# Patient Record
Sex: MALE | Race: BLACK OR AFRICAN AMERICAN | URBAN - METROPOLITAN AREA
[De-identification: names, ages, dates, MRNs, and addresses within clinical notes are randomized per-mention and may not be internally consistent; named-entity substitution may affect disease eponyms.]

---
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
## 2023-05-25 RX ORDER — CETIRIZINE HYDROCHLORIDE 10 MG/1
10 TABLET ORAL DAILY
COMMUNITY
Start: 2016-05-18

## 2023-05-25 RX ORDER — FLUTICASONE PROPIONATE 50 MCG
SPRAY, SUSPENSION (ML) NASAL
COMMUNITY
Start: 2016-05-18

## 2024-12-27 ENCOUNTER — OFFICE VISIT (OUTPATIENT)
Age: 24
End: 2024-12-27

## 2024-12-27 VITALS
HEART RATE: 80 BPM | DIASTOLIC BLOOD PRESSURE: 78 MMHG | BODY MASS INDEX: 25.19 KG/M2 | OXYGEN SATURATION: 99 % | TEMPERATURE: 99.1 F | SYSTOLIC BLOOD PRESSURE: 128 MMHG | HEIGHT: 72 IN | WEIGHT: 186 LBS

## 2024-12-27 DIAGNOSIS — J06.9 VIRAL UPPER RESPIRATORY TRACT INFECTION: Primary | ICD-10-CM

## 2024-12-27 RX ORDER — DEXTROMETHORPHAN HYDROBROMIDE AND PROMETHAZINE HYDROCHLORIDE 15; 6.25 MG/5ML; MG/5ML
5 SYRUP ORAL 4 TIMES DAILY PRN
Qty: 100 ML | Refills: 0 | Status: SHIPPED | OUTPATIENT
Start: 2024-12-27 | End: 2025-01-01

## 2024-12-27 RX ORDER — FEXOFENADINE HCL 180 MG/1
180 TABLET ORAL DAILY
Qty: 30 TABLET | Refills: 0 | Status: SHIPPED | OUTPATIENT
Start: 2024-12-27 | End: 2025-01-26

## 2024-12-27 RX ORDER — BENZONATATE 200 MG/1
200 CAPSULE ORAL 2 TIMES DAILY PRN
Qty: 14 CAPSULE | Refills: 0 | Status: SHIPPED | OUTPATIENT
Start: 2024-12-27 | End: 2025-01-03

## 2024-12-27 NOTE — PROGRESS NOTES
Subjective   History was provided by the patient.    Mannie Calzada is a 24 y.o. male who presents for evaluation of symptoms of a URI. Symptoms include nasal congestion, non-productive cough, and cervical adenopathy. Onset of symptoms was 1 week ago, and is stable since that time. Evaluation to date: none. Treatment to date: Mucinex, Dayquil, Nyquil, Theraflu.       Objective   Physical Exam   General: alert, appears stated age, and cooperative  Ear exam: normal TM's and external ear canals both ears  Sinus exam: Normal paranasal sinuses without tenderness  Oropharynx exam: normal findings: pink and moist  Neck exam: mild anterior cervical adenopathy  Heart exam: S1 and S2 normal  Lung exam: clear to auscultation bilaterally      Assessment   1. Viral upper respiratory tract infection  Patient  appears well, VSS, afebrile, SPO2 99% on room air. No distress noted.   Ears normal.  Throat and pharynx normal.  Neck supple. Mild adenopathy in the neck. Nose is congested. Sinuses non tender. The chest is clear, without wheezes or rales.    Patient is previously healthy and immunocompetent, presenting with symptoms suspicious for likely viral upper respiratory infection.  Differential includes acute bronchitis, rhinosinusitis, allergic rhinitis, bacterial pneumonia, or COVID. Patient is nontoxic appearing and not in need of emergent medical intervention.    The patient is a good candidate for outpatient therapy based on normal PO intake, reassuring exam with clear lungs on auscultation, normal oxygen saturations and lack of respiratory distress upon discharge.    Discharge decision based on the following:  clinical impression is consistent with outpatient treatment, patient's exam is stable and patient's condition is stable. Patient advised if symptoms fail to improve or worsens to follow-up with PCP or ER.  Patient verbalized understanding, no questions or concerns at this time.      - benzonatate (TESSALON) 200 MG

## 2024-12-27 NOTE — PATIENT INSTRUCTIONS
If symptoms worsens or fail to improve follow-up with PCP or ER.    Thank you for visiting [facility] today    Nasal Congestion:  Flonase or Nasonex (over the counter) nasal spray, once a day  Saline irrigation kits help wash out sinuses 1-2 times a day  Normal saline nasal spray    Cough:  Throat lozenges, hot tea, and honey may help  Vicks VapoRub at night to help with cough and relieve muscles aches and pain  If you have high blood pressure, take Coricidin HBP (or the generic form) instead.  Follow instructions on the box.  Congestion:  Take Advil Cold & Sinus as directed  Sore Throat:  Lozenges, as needed. Cepacol lozenges will help numb the throat  Chloraseptic spray also helps to numb throat pain  Salt water gargles to soothe throat pain  Sinus pain/pressure:  Warm, wet towel on face to help with facial sinus pain/pressure  Headache/Pain Fever/Body Aches:  If you can take NSAIDs, take Ibuprofen 400-800mg every 8 hours as needed  If you cannot take NSAIDs, take Tylenol 325-500mg every 6 hours as needed  Miscellanous:  Zyrtec/Xyzal/Allegra/Claritin during the day.  Simple foods like chicken noodle soup, smoothies, hot tea with lemon and honey may also help  Avoid smoking  Minimize exposure to irritants    Please follow up with your primary care provider within 2-5 days if your signs and symptoms have not resolved or worsened.    Please go immediately to the Emergency Department if you develop shortness of breath, chest pain and uncontrollable fever above 100.4F.